# Patient Record
Sex: MALE | HISPANIC OR LATINO | Employment: FULL TIME | URBAN - NONMETROPOLITAN AREA
[De-identification: names, ages, dates, MRNs, and addresses within clinical notes are randomized per-mention and may not be internally consistent; named-entity substitution may affect disease eponyms.]

---

## 2017-10-20 ENCOUNTER — OCCUPATIONAL MEDICINE (OUTPATIENT)
Dept: URGENT CARE | Facility: PHYSICIAN GROUP | Age: 47
End: 2017-10-20
Payer: COMMERCIAL

## 2017-10-20 VITALS
DIASTOLIC BLOOD PRESSURE: 86 MMHG | RESPIRATION RATE: 16 BRPM | TEMPERATURE: 98.4 F | OXYGEN SATURATION: 95 % | WEIGHT: 205 LBS | HEART RATE: 84 BPM | BODY MASS INDEX: 29.35 KG/M2 | HEIGHT: 70 IN | SYSTOLIC BLOOD PRESSURE: 158 MMHG

## 2017-10-20 DIAGNOSIS — H10.89 OTHER CONJUNCTIVITIS OF RIGHT EYE: ICD-10-CM

## 2017-10-20 DIAGNOSIS — H57.11 PAIN OF RIGHT EYE: ICD-10-CM

## 2017-10-20 PROCEDURE — 99203 OFFICE O/P NEW LOW 30 MIN: CPT | Mod: 29 | Performed by: PHYSICIAN ASSISTANT

## 2017-10-20 NOTE — LETTER
"   [facility] [place] [address] [place] [address]  Phone:  [phone] - Fax:  [phone]   [facility] Progress Report and Disability Certification  Date of Service: 10/20/2017   No Show:  No  Date / Time of Next Visit: 10/23/2017   Claim Information   Patient Name: Prakash Cantu  Claim Number:     Employer:    Date of Injury: 10/20/2017     Insurer / TPA: [employer]  ID / SSN:     Occupation: [employer]  Diagnosis: Diagnoses of Pain of right eye and Other conjunctivitis of right eye were pertinent to this visit.    Medical Information   Related to Industrial Injury? Yes    Subjective Complaints:  Patient comes in today with complaints of right eye pain that started at work. DOI: 10/20/17 around 1400 hrs. Patient was cutting a joist with a saw while wearing safety glasses when he felt something go into his right eye. Patient has excessive tearing, ocular pain, and worsening irritation with any eye movement. Patient denies blurry vision, headache, dizziness. He does not work contact lenses or glasses. He has no other places of employment. Patient has no history of ocular issues in the right eye.   Objective Findings: Blood pressure 158/86, pulse 84, temperature 36.9 °C (98.4 °F), resp. rate 16, height 1.778 m (5' 10\"), weight 93 kg (205 lb), SpO2 95 %.  General: Well developed, well nourished. No distress.  HEENT:Conjunctival injection in the right eye with excessive tearing. No foreign body noted on eyelid eversion, upper and lower. Eyes irrigated with copious amounts of saline. No fluorescein uptake was noted. Patient had almost complete relief with proparacaine.  Pulmonary: No respiratory distress noted.  Neurologic: Grossly nonfocal.  Skin: Warm, dry, good turgor. No rashes in visible areas.   Psych: Normal mood. Alert and oriented x3. Judgment and insight is normal.    VA:   OD: 20/30  OS: 20/25  OU: 20/25   Pre-Existing " Condition(s):     Assessment:   Initial Visit    Status: Additional Care Required  Permanent Disability:No    Plan:   Comments:OTC saline drops only    Diagnostics:      Comments:  PLAN: Patient to return to clinic on Monday, 10/23/17 for reevaluation and clearance if he remains asymptomatic. The patient remains symptomatic, he needs to follow-up with optometry on Monday. No restrictions for work.    Disability Information   Status: Released to Full Duty    From:  10/20/2017  Through: 10/23/2017 Restrictions are:     Physical Restrictions   Sitting:    Standing:    Stooping:    Bending:      Squatting:    Walking:    Climbing:    Pushing:      Pulling:    Other:    Reaching Above Shoulder (L):   Reaching Above Shoulder (R):       Reaching Below Shoulder (L):    Reaching Below Shoulder (R):      Not to exceed Weight Limits   Carrying(hrs):   Weight Limit(lb):   Lifting(hrs):   Weight  Limit(lb):     Comments:      Repetitive Actions   Hands: i.e. Fine Manipulations from Grasping:     Feet: i.e. Operating Foot Controls:     Driving / Operate Machinery:     Physician Name: Dipika Miles P.A.-C. Physician Signature:   DIPIKA MILES P.A.-C. e-Signature: Dr. Adam Mosher, Medical Director   Clinic Name / Location: [address] [address] Clinic Phone Number: Dept: [phone]   Appointment Time: 3:10 Pm Visit Start Time: 3:31 PM   Check-In Time:  3:29 Pm Visit Discharge Time:  4:20pm   Original-Treating Physician or Chiropractor    Page 2-Insurer/TPA    Page 3-Employer    Page 4-Employee

## 2017-10-20 NOTE — LETTER
"EMPLOYEE’S CLAIM FOR COMPENSATION/ REPORT OF INITIAL TREATMENT  FORM C-4    EMPLOYEE’S CLAIM - PROVIDE ALL INFORMATION REQUESTED   First Name  Prakash Last Name  Pepe Birthdate                    1970                Sex  male Claim Number   Home Address  [address] Age  46 y.o. Height  1.778 m (5' 10\") Weight  93 kg (205 lb) [facility]     [facility] Zip  [address] Telephone  [phone] (home)    Mailing Address  [address] [place] [facility] [place] [facility] Zip  [address] Primary Language Spoken  English    Insurer   Third Party   [employer]   Employee's Occupation (Job Title) When Injury or Occupational Disease Occurred  [employer]    Employer's Name     Telephone      Employer Address    City    State    Zip     Date of Injury  10/20/2017               Hour of Injury  2:00 PM Date Employer Notified  10/20/2017 Last Day of Work after Injury or Occupational Disease  10/20/2017 Supervisor to Whom Injury Reported  Chris   Address or Location of Accident (if applicable)  [place]   What were you doing at the time of accident? (if applicable)  putting up joist with sawzaw    How did this injury or occupational disease occur? (Be specific an answer in detail. Use additional sheet if necessary)  puuting up joist with sawzaw ans a piece of wood feel in my eye.    If you believe that you have an occupational disease, when did you first have knowledge of the disability and it relationship to your employment?  NA Witnesses to the Accident  NO      Nature of Injury or Occupational Disease  Workers' Compensation  Part(s) of Body Injured or Affected  Eye (R), ,     I certify that the above is true and correct to the best of my knowledge and that I have provided this information in order to obtain the benefits of [place]  I hereby authorize any physician, chiropractor, surgeon, practitioner, or other person, any hospital, including [facility] or [facility] hospital, any medical service organization, any insurance company, or other institution or organization to release to each other, any medical or other information, including benefits paid or payable, pertinent to this injury or disease, except information relative to diagnosis, treatment and/or counseling for AIDS, psychological conditions, alcohol or controlled substances, for which I must give specific authorization.  A Photostat of this authorization shall be as valid as the original.     Date   Place   Employee’s Signature   THIS REPORT MUST BE COMPLETED AND MAILED WITHIN 3 WORKING DAYS OF TREATMENT   Place  [facility]  Name of Facility  [facility]   Date  10/20/2017 Diagnosis  (H57.11) Pain of right eye  (H10.89) Other conjunctivitis of right eye Is there evidence the injured employee was under the influence of alcohol and/or another controlled substance at the time of accident?   Hour  3:31 PM Description of Injury or Disease  Diagnoses of Pain of right eye and Other conjunctivitis of right eye were pertinent to this visit. No   Treatment  Patient to return to clinic on Monday, 10/23/17 for reevaluation and clearance if he remains asymptomatic. The patient remains symptomatic, he needs to follow-up with optometry on Monday. No restrictions for work.  Have you advised the patient to remain off work five days or more? No   X-Ray Findings      If Yes   From Date  To Date      From information given by the employee, together with medical evidence, can you directly connect this injury or occupational disease as job incurred?  Yes If No Full Duty  Yes Modified Duty      Is additional medical care by a physician indicated?  Yes If Modified Duty, Specify any Limitations / Restrictions      Do you know of any previous  "injury or disease contributing to this condition or occupational disease?                            No   Date  10/20/2017 Print Doctor’s Name Dipika Miles P.A.-C. I certify the employer’s copy of  this form was mailed on:   Address  [address] Insurer’s Use Only     [place] Zip  [address]    Provider’s Tax ID Number  [id number] Telephone  Dept: [phone]          DIPIKA MILES P.A.-C.   e-Signature: Dr. Adam Mosher, Medical Director Degree  PAC        ORIGINAL-TREATING PHYSICIAN OR CHIROPRACTOR    PAGE 2-INSURER/TPA    PAGE 3-EMPLOYER    PAGE 4-EMPLOYEE             Form C-4 (rev10/07)              BRIEF DESCRIPTION OF RIGHTS AND BENEFITS  (Pursuant to [place] 616[place].050)    Notice of Injury or Occupational Disease (Incident Report Form C-1): If an injury or occupational disease (OD) arises out of and in the  course of employment, you must provide written notice to your employer as soon as practicable, but no later than 7 days after the accident or  OD. Your employer shall maintain a sufficient supply of the required forms.    Claim for Compensation (Form C-4): If medical treatment is sought, the form C-4 is available at the place of initial treatment. A completed  \"Claim for Compensation\" (Form C-4) must be filed within 90 days after an accident or OD. The treating physician or chiropractor must,  within 3 working days after treatment, complete and mail to the employer, the employer's insurer and third-party , the Claim for  Compensation.    Medical Treatment: If you require medical treatment for your on-the-job injury or OD, you may be required to select a physician or  chiropractor from a list provided by your workers’ compensation insurer, if it has contracted with an Organization for Managed Care (MCO) or  Preferred Provider Organization (PPO) or providers of health care. If your employer has not entered into a contract with an MCO or PPO, you  may select a " physician or chiropractor from the Panel of Physicians and Chiropractors. Any medical costs related to your industrial injury or  OD will be paid by your insurer.    Temporary Total Disability (TTD): If your doctor has certified that you are unable to work for a period of at least 5 consecutive days, or 5  cumulative days in a 20-day period, or places restrictions on you that your employer does not accommodate, you may be entitled to TTD  compensation.    Temporary Partial Disability (TPD): If the wage you receive upon reemployment is less than the compensation for TTD to which you are  entitled, the insurer may be required to pay you TPD compensation to make up the difference. TPD can only be paid for a maximum of 24  months.    Permanent Partial Disability (PPD): When your medical condition is stable and there is an indication of a PPD as a result of your injury or  OD, within 30 days, your insurer must arrange for an evaluation by a rating physician or chiropractor to determine the degree of your PPD. The  amount of your PPD award depends on the date of injury, the results of the PPD evaluation and your age and wage.    Permanent Total Disability (PTD): If you are medically certified by a treating physician or chiropractor as permanently and totally disabled  and have been granted a PTD status by your insurer, you are entitled to receive monthly benefits not to exceed 66 2/3% of your average  monthly wage. The amount of your PTD payments is subject to reduction if you previously received a PPD award.    Vocational Rehabilitation Services: You may be eligible for vocational rehabilitation services if you are unable to return to the job due to a  permanent physical impairment or permanent restrictions as a result of your injury or occupational disease.    Transportation and Per Natalee Reimbursement: You may be eligible for travel expenses and per natalee associated with medical treatment.    Reopening: You may be able  to reopen your claim if your condition worsens after claim closure.    Appeal Process: If you disagree with a written determination issued by the insurer or the insurer does not respond to your request, you may  appeal to the Department of Administration, , by following the instructions contained in your determination letter. You must  appeal the determination within 70 days from the date of the determination letter at [address], [address], [place], [place]  [address], or [address], [address], [place], [place] [address]. If you disagree with the  decision, you may appeal to the  Department of Administration, . You must file your appeal within 30 days from the date of the  decision  letter at [address], [address], [place], [place] [address], or [address], [address], [place], [place] [address]. If you  disagree with a decision of an , you may file a petition for judicial review with the District Court. You must do so within 30  days of the Appeal Officer’s decision. You may be represented by an  at your own expense or you may contact the [facility] for possible  representation.    [place]  for Injured Workers (NAIW): If you disagree with a  decision, you may request that NAIW represent you  without charge at an  Hearing. For information regarding denial of benefits, you may contact the [facility] at: [address], [address], [place] [address], [phone], or [address], [address], [place] [address], [phone]    To File a Complaint with the Division: If you wish to file a complaint with the  of the Division of Industrial Relations (DIR),  please contact the Workers’ Compensation Section, [address], [address], [place], [place] [address], telephone [phone]) [phone], or  [address], [address],  [place], [place] [address], telephone [phone].    For assistance with Workers’ Compensation Issues: you may contact the Office of the Governor Consumer Health Assistance, [address] [place] [address], [address], [place], [place] [address], Toll Free 1-439.665.9039, Web site: [email or web address]., E-mail  [email or web address].                                                                                                                                                                                                                                   __________________________________________________________________                                                                   _________________                Employee Name / Signature                                                                                                                                                       Date                                                                                                                                                                                                     D-2 (rev. 10/07)

## 2017-10-20 NOTE — PROGRESS NOTES
"Chief Complaint   Patient presents with   • Foreign Body in Eye       HISTORY OF PRESENT ILLNESS: Patient is a 46 y.o. male who presents today for the following:    Patient comes in today with complaints of right eye pain that started at work. DOI: 10/20/17 around 1400 hrs. Patient was cutting a joist with a saw while wearing safety glasses when he felt something go into his right eye. Patient has excessive tearing, ocular pain, and worsening irritation with any eye movement. Patient denies blurry vision, headache, dizziness. He does not work contact lenses or glasses. He has no other places of employment. Patient has no history of ocular issues in the right eye.    There are no active problems to display for this patient.      Allergies:Review of patient's allergies indicates no known allergies.    No current [employer]-ordered outpatient prescriptions on file.     No current [employer]-ordered facility-administered medications on file.        No past medical history on file.    Social History   Substance Use Topics   • Smoking status: Not on file   • Smokeless tobacco: Not on file   • Alcohol use Not on file       No family status information on file.   No family history on file.    ROS:    Review of Systems   Constitutional: Negative for fever, chills, weight loss and malaise/fatigue.   Eyes: Negative for blurred vision.     Exam:  Blood pressure 158/86, pulse 84, temperature 36.9 °C (98.4 °F), resp. rate 16, height 1.778 m (5' 10\"), weight 93 kg (205 lb), SpO2 95 %.  General: Well developed, well nourished. No distress.  HEENT:Conjunctival injection in the right eye with excessive tearing. No foreign body noted on eyelid eversion, upper and lower. Eyes irrigated with copious amounts of saline. No fluorescein uptake was noted. Patient had almost complete relief with proparacaine.  Pulmonary: No respiratory distress noted.  Neurologic: Grossly nonfocal.  Skin: Warm, dry, good turgor. No rashes in visible areas.   Psych: " Normal mood. Alert and oriented x3. Judgment and insight is normal.    VA:   OD: 20/30  OS: 20/25  OU: 20/25    Assessment/Plan:  Patient to return to clinic on Monday, 10/23/17 for reevaluation and clearance if he remains asymptomatic. The patient remains symptomatic, he needs to follow-up with optometry on Monday. No restrictions for work.  1. Pain of right eye     2. Other conjunctivitis of right eye

## 2017-10-23 ENCOUNTER — OCCUPATIONAL MEDICINE (OUTPATIENT)
Dept: URGENT CARE | Facility: PHYSICIAN GROUP | Age: 47
End: 2017-10-23
Payer: COMMERCIAL

## 2017-10-23 VITALS
SYSTOLIC BLOOD PRESSURE: 120 MMHG | BODY MASS INDEX: 29.19 KG/M2 | OXYGEN SATURATION: 98 % | HEART RATE: 84 BPM | RESPIRATION RATE: 16 BRPM | DIASTOLIC BLOOD PRESSURE: 78 MMHG | TEMPERATURE: 98.3 F | WEIGHT: 203.9 LBS | HEIGHT: 70 IN

## 2017-10-23 DIAGNOSIS — H57.8A9 SENSATION OF FOREIGN BODY IN EYE: ICD-10-CM

## 2017-10-23 PROCEDURE — 99213 OFFICE O/P EST LOW 20 MIN: CPT | Performed by: FAMILY MEDICINE

## 2017-10-23 ASSESSMENT — ENCOUNTER SYMPTOMS
BLURRED VISION: 0
DOUBLE VISION: 0
PHOTOPHOBIA: 0

## 2017-10-23 ASSESSMENT — PAIN SCALES - GENERAL: PAINLEVEL: NO PAIN

## 2017-10-23 NOTE — PROGRESS NOTES
"Subjective:      Prakash Cantu is a 46 y.o. male who presents with Follow-Up (eye injury)      DOI: 10/20/17  Foreign body sensation right eye while cutting joist with saw. Initial exam revealed injected conjunctiva but no obvious foreign body or fluorescein uptake. Pain, redness, and drainage have completely resolved. Vision is his normal.      HPI    Review of Systems   Eyes: Negative for blurred vision, double vision and photophobia.   Skin: Negative for itching and rash.          Objective:     /78   Pulse 84   Temp 36.8 °C (98.3 °F)   Resp 16   Ht 1.778 m (5' 10\")   Wt 92.5 kg (203 lb 14.4 oz)   SpO2 98%   BMI 29.26 kg/m²      Physical Exam   Constitutional: He appears well-developed and well-nourished. No distress.   Neurological:   Speech is clear. Patient is appropriate and cooperative.     Skin: Skin is warm and dry. No rash noted.       Right eye: PERRLA, EOMI, conjunctiva clear, no foreign body       Assessment/Plan:     1. Sensation of foreign body in eye    Resolved  Discharge MMI      "

## 2017-10-23 NOTE — LETTER
[address] [place] [address]  Phone:  [phone] - Fax:  [phone]   [facility] Progress Report and Disability Certification  Date of Service: 10/23/2017   No Show:  No  Date / Time of Next Visit:  MMI   Claim Information   Patient Name: Prakash Cantu  Claim Number:     Employer:   [employer] Date of Injury: 10/20/2017     Insurer / TPA: [employer]  ID / SSN:     Occupation: [employer]  Diagnosis: The encounter diagnosis was Sensation of foreign body in eye.    Medical Information   Related to Industrial Injury? Yes    Subjective Complaints:  DOI: 10/20/17  Foreign body sensation right eye while cutting joist with saw. Initial exam revealed injected conjunctiva but no obvious foreign body or fluorescein uptake. Pain, redness, and drainage have completely resolved. Vision is his normal.    Objective Findings: Right eye: PERRLA, EOMI, conjunctiva clear, no foreign body   Pre-Existing Condition(s):     Assessment:   Condition Improved    Status: Discharged /  MMI  Permanent Disability:No    Plan:      Diagnostics:      Comments:       Disability Information   Status: Released to Full Duty    From:  10/23/2017  Through:   Restrictions are:     Physical Restrictions   Sitting:    Standing:    Stooping:    Bending:      Squatting:    Walking:    Climbing:    Pushing:      Pulling:    Other:    Reaching Above Shoulder (L):   Reaching Above Shoulder (R):       Reaching Below Shoulder (L):    Reaching Below Shoulder (R):      Not to exceed Weight Limits   Carrying(hrs):   Weight Limit(lb):   Lifting(hrs):   Weight  Limit(lb):     Comments:      Repetitive Actions   Hands: i.e. Fine Manipulations from Grasping:     Feet: i.e. Operating Foot Controls:     Driving / Operate Machinery:     Physician Name: Micheal Hinojosa M.D. Physician Signature: MICHEAL Broussard M.D. e-Signature:  , Medical Director   Clinic Name /  Location: [address]  [place] [address] Clinic Phone Number: Dept: [phone]   Appointment Time: 9:30 Am Visit Start Time: 9:48 AM   Check-In Time:  9:28 Am Visit Discharge Time: 10:09 AM   Original-Treating Physician or Chiropractor    Page 2-Insurer/TPA    Page 3-Employer    Page 4-Employee